# Patient Record
Sex: MALE | Race: WHITE | NOT HISPANIC OR LATINO
[De-identification: names, ages, dates, MRNs, and addresses within clinical notes are randomized per-mention and may not be internally consistent; named-entity substitution may affect disease eponyms.]

---

## 2017-11-17 ENCOUNTER — APPOINTMENT (OUTPATIENT)
Dept: DERMATOLOGY | Facility: CLINIC | Age: 57
End: 2017-11-17
Payer: COMMERCIAL

## 2017-11-17 PROCEDURE — 99213 OFFICE O/P EST LOW 20 MIN: CPT

## 2018-11-16 ENCOUNTER — APPOINTMENT (OUTPATIENT)
Dept: DERMATOLOGY | Facility: CLINIC | Age: 58
End: 2018-11-16
Payer: COMMERCIAL

## 2018-11-16 PROCEDURE — 99213 OFFICE O/P EST LOW 20 MIN: CPT

## 2019-11-15 ENCOUNTER — APPOINTMENT (OUTPATIENT)
Dept: DERMATOLOGY | Facility: CLINIC | Age: 59
End: 2019-11-15
Payer: COMMERCIAL

## 2019-11-15 PROCEDURE — 11102 TANGNTL BX SKIN SINGLE LES: CPT

## 2019-11-15 PROCEDURE — 99214 OFFICE O/P EST MOD 30 MIN: CPT | Mod: 25

## 2020-11-20 ENCOUNTER — APPOINTMENT (OUTPATIENT)
Dept: DERMATOLOGY | Facility: CLINIC | Age: 60
End: 2020-11-20
Payer: COMMERCIAL

## 2020-11-20 PROCEDURE — 99214 OFFICE O/P EST MOD 30 MIN: CPT

## 2021-11-22 ENCOUNTER — APPOINTMENT (OUTPATIENT)
Dept: DERMATOLOGY | Facility: CLINIC | Age: 61
End: 2021-11-22
Payer: COMMERCIAL

## 2021-11-22 PROCEDURE — 99213 OFFICE O/P EST LOW 20 MIN: CPT

## 2022-10-27 ENCOUNTER — APPOINTMENT (OUTPATIENT)
Dept: GASTROENTEROLOGY | Facility: CLINIC | Age: 62
End: 2022-10-27

## 2022-10-27 VITALS
DIASTOLIC BLOOD PRESSURE: 68 MMHG | HEART RATE: 59 BPM | RESPIRATION RATE: 16 BRPM | SYSTOLIC BLOOD PRESSURE: 118 MMHG | OXYGEN SATURATION: 98 % | BODY MASS INDEX: 24.83 KG/M2 | TEMPERATURE: 98.7 F | WEIGHT: 149 LBS | HEIGHT: 65 IN

## 2022-10-27 DIAGNOSIS — Z78.9 OTHER SPECIFIED HEALTH STATUS: ICD-10-CM

## 2022-10-27 DIAGNOSIS — Z80.0 FAMILY HISTORY OF MALIGNANT NEOPLASM OF DIGESTIVE ORGANS: ICD-10-CM

## 2022-10-27 PROCEDURE — 99205 OFFICE O/P NEW HI 60 MIN: CPT

## 2022-11-02 ENCOUNTER — LABORATORY RESULT (OUTPATIENT)
Age: 62
End: 2022-11-02

## 2022-11-02 LAB
INR PPP: 0.97 RATIO
PT BLD: 11.3 SEC

## 2022-11-21 ENCOUNTER — APPOINTMENT (OUTPATIENT)
Dept: GASTROENTEROLOGY | Facility: CLINIC | Age: 62
End: 2022-11-21

## 2022-11-21 VITALS
HEIGHT: 65 IN | RESPIRATION RATE: 16 BRPM | BODY MASS INDEX: 24.83 KG/M2 | SYSTOLIC BLOOD PRESSURE: 125 MMHG | OXYGEN SATURATION: 98 % | WEIGHT: 149 LBS | HEART RATE: 60 BPM | DIASTOLIC BLOOD PRESSURE: 80 MMHG | TEMPERATURE: 98 F

## 2022-11-21 LAB
A1AT PHENOTYP SERPL-IMP: NORMAL
A1AT SERPL-MCNC: 168 MG/DL
ALBUMIN SERPL ELPH-MCNC: 4.4 G/DL
ALP BLD-CCNC: 104 U/L
ALT SERPL-CCNC: 478 U/L
ANION GAP SERPL CALC-SCNC: 12 MMOL/L
AST SERPL-CCNC: 264 U/L
BILIRUB INDIRECT SERPL-MCNC: 0.2 MG/DL
BILIRUB SERPL-MCNC: 0.4 MG/DL
BUN SERPL-MCNC: 14 MG/DL
CALCIUM SERPL-MCNC: 9.4 MG/DL
CHLORIDE SERPL-SCNC: 104 MMOL/L
CMV DNA SPEC QL NAA+PROBE: NOT DETECTED IU/ML
CMVPCR LOG: NOT DETECTED LOG10IU/ML
CO2 SERPL-SCNC: 22 MMOL/L
CREAT SERPL-MCNC: 1.05 MG/DL
EBV DNA SERPL NAA+PROBE-ACNC: NOT DETECTED IU/ML
EBVPCR LOG: NOT DETECTED LOG10IU/ML
EGFR: 80 ML/MIN/1.73M2
FERRITIN SERPL-MCNC: 151 NG/ML
GLUCOSE SERPL-MCNC: 118 MG/DL
HEPATITIS E IGM ABY: NOT DETECTED
HETEROPH AB SER QL: NEGATIVE
HEV AB SER QL: NEGATIVE
HIV1+2 AB SPEC QL IA.RAPID: NONREACTIVE
HSV 1 IGG TYPE-SPECIFIC AB: 1.14 INDEX
HSV 2 IGG TYPE-SPECIFIC AB: <0.9 INDEX
IRON SATN MFR SERPL: 32 %
IRON SERPL-MCNC: 128 UG/DL
NT-PROBNP SERPL-MCNC: 43 PG/ML
POTASSIUM SERPL-SCNC: 4.7 MMOL/L
PROT SERPL-MCNC: 6.6 G/DL
SODIUM SERPL-SCNC: 137 MMOL/L
SOLUBLE LIVER IGG SER IA-ACNC: 1.1
T PALLIDUM AB SER QL IA: NEGATIVE
TIBC SERPL-MCNC: 406 UG/DL
TM INTERPRETATION: NORMAL
TSH SERPL-ACNC: 1.16 UIU/ML
UIBC SERPL-MCNC: 277 UG/DL

## 2022-11-21 PROCEDURE — 99215 OFFICE O/P EST HI 40 MIN: CPT

## 2022-11-21 NOTE — ADDENDUM
[FreeTextEntry1] : I, Salome Welch NP, acted as scribe for SUSAN Henderson for this patient encounter.

## 2022-11-21 NOTE — PHYSICAL EXAM
[Non-Tender] : non-tender [Smooth] : smooth [General Appearance - Alert] : alert [General Appearance - In No Acute Distress] : in no acute distress [Sclera] : the sclera and conjunctiva were normal [Outer Ear] : the ears and nose were normal in appearance [Oropharynx] : the oropharynx was normal [Neck Appearance] : the appearance of the neck was normal [Auscultation Breath Sounds / Voice Sounds] : lungs were clear to auscultation bilaterally [Heart Rate And Rhythm] : heart rate was normal and rhythm regular [Edema] : there was no peripheral edema [Bowel Sounds] : normal bowel sounds [Abdomen Soft] : soft [Abdomen Tenderness] : non-tender [Abdomen Mass (___ Cm)] : no abdominal mass palpated [Abnormal Walk] : normal gait [Nail Clubbing] : no clubbing  or cyanosis of the fingernails [Musculoskeletal - Swelling] : no joint swelling seen [Motor Tone] : muscle strength and tone were normal [Skin Color & Pigmentation] : normal skin color and pigmentation [Skin Turgor] : normal skin turgor [] : no rash [No Focal Deficits] : no focal deficits [Oriented To Time, Place, And Person] : oriented to person, place, and time [Impaired Insight] : insight and judgment were intact [Affect] : the affect was normal [Scleral Icterus] : No Scleral Icterus [Spider Angioma] : No spider angioma(s) were observed [Abdominal  Ascites] : no ascites [Splenomegaly] : no splenomegaly [Scrotal Edema] : Scrotum is not edematous [Asterixis] : no asterixis observed [Jaundice] : No jaundice [Palmar Erythema] : no Palmar Erythema [Depression] : no depression [Hallucinations] : ~T no ~M hallucinations

## 2022-11-21 NOTE — ASSESSMENT
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 LFTs can remain elevated for up to a year after discontinuation.\par LFTs remain elevated and actually higher than previously\par will recheck LFTs today\par if LFTs are not downtrending, will consider Liver Biopsy\par CT abdomen pending\par \par Follow up after labs

## 2022-11-21 NOTE — HISTORY OF PRESENT ILLNESS
[de-identified] : JAMES CARTER is a 62 year old male with no significant PMH presents today for evaluation of elevated LFTs.\par \par 11/21/22: Labs done at last visit reviewed with pt. LFTs now Bilirubin 0.4, , , . Previous LFTs in August - Bilirubin 0.5, , , AP 94. Iron studies normal, A1A normal, TFTs normal. Hemochromatosis + H63D heterozygous mutation. [place] [facility] [place] and autoimmune hepatitis and normal ceruloplasmin. US abdomen done in August was normal except for 3 mm gallbladder polyp.\par \par Denies pruritus, recent infection, abdominal pain or distension, jaundice, hematemesis, hematochezia, dark urine, confusion, unintentional weight loss or gain. Drinks alcohol socially. Pt does take multiple herbal supplements mainly for exercise and weight management. Pt has stopped all supplements since elevation in LFTs found. Pt is pending CT scan to be done. [FreeTextEntry1] : 62-year-old male patient with no significant past medical history presents for evaluation of elevated LFTs.\par \par \par No risk factors of viral hepatitis.\par No significant alcohol intake.\par No family history of liver disease.\par No history of sudden cardiac deaths in the family.\par  \par \par Denies Abdominal pain, Abdominal distension, Jaundice,Pedal Edema,Hematemesis,Melena,Confusion.\par Physical exam:\par Gen: A&Ox3, NAD\par HEENT: normal outer ears & nose, PERRL, mucus membranes moist, anicteric, no lymphadenopathy\par CVS: regular rate and rhythm, no murmur\par Pulm: vesicular breath sounds\par Abdomen: normal bowel sounds, soft, nontender, no hepatosplenomegaly\par Legs: no edema, no clubbing\par Musculoskeletal: normal gait\par Skin: no rash\par Neuro: no asterixis, EOMI\par Psych: normal affect\par \par

## 2022-11-22 ENCOUNTER — RESULT REVIEW (OUTPATIENT)
Age: 62
End: 2022-11-22

## 2022-11-22 LAB
ALBUMIN SERPL ELPH-MCNC: 4.3 G/DL
ALP BLD-CCNC: 95 U/L
ALT SERPL-CCNC: 322 U/L
AST SERPL-CCNC: 188 U/L
BILIRUB DIRECT SERPL-MCNC: 0.1 MG/DL
BILIRUB INDIRECT SERPL-MCNC: 0.2 MG/DL
BILIRUB SERPL-MCNC: 0.3 MG/DL
INR PPP: 0.98 RATIO
PROT SERPL-MCNC: 6.7 G/DL
PT BLD: 11.4 SEC

## 2022-11-22 NOTE — ASSESSMENT
[FreeTextEntry1] : Labs ordered to rule out various etiologies of liver disease that would not done at the primary GI clinic.\par Ordered HSV CMV EBV RPR for syphilis, HEV Hiv etc\par Recommendation based on lab values.  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

## 2022-11-22 NOTE — HISTORY OF PRESENT ILLNESS
[FreeTextEntry1] : A very fit and young looking 62-year-old male patient with no significant past medical history presents for evaluation of elevated LFTs when he saw his primary care physician for some migratory joint pains.  No such prior history.\par Labs on 8/30/2022 showed AST of 119  normal alkaline phosphatase, normal bilirubin.  At that time hepatitis A, B, and C serologies were sent out which were negative and as per the scanned chart report from [facility] [place] the patient had normal ceruloplasmin normal ASMA AMA, immunoglobulin G.  He has H63D hemochromatosis gene mutatio, alpha-1 antitrypsin level normal, AFP normal\par Ultrasound done on 8/30/2022 showed a normal liver and spleen with gallbladder polyps up to 3 mm in diameter.\par Repeat labs on 9/30/2022 showed ALT of 307, , alkaline phosphatase 94, normal INR\par Labs on\par \par He has no past medical history of any disease and is not anything taking any therapeutic medications.\par Patient does have a significant history of taking supplements\par \par \par No risk factors of viral hepatitis.\par No significant alcohol intake.\par No family history of liver disease.  Mother had pancreatic cancer.\par No history of sudden cardiac deaths in the family.\par  \par \par Denies Abdominal pain, Abdominal distension, Jaundice,Pedal Edema,Hematemesis,Melena,Confusion.\par Physical exam:\par Gen: A&Ox3, NAD\par HEENT: normal outer ears & nose, PERRL, mucus membranes moist, anicteric, no lymphadenopathy\par CVS: regular rate and rhythm, no murmur\par Pulm: vesicular breath sounds\par Abdomen: normal bowel sounds, soft, nontender, no hepatosplenomegaly\par Legs: no edema, no clubbing\par Musculoskeletal: normal gait\par Skin: no rash\par Neuro: no asterixis, EOMI\par Psych: normal affect\par \par

## 2022-11-23 ENCOUNTER — APPOINTMENT (OUTPATIENT)
Dept: DERMATOLOGY | Facility: CLINIC | Age: 62
End: 2022-11-23

## 2022-11-23 PROCEDURE — 11102 TANGNTL BX SKIN SINGLE LES: CPT

## 2022-11-23 PROCEDURE — 99213 OFFICE O/P EST LOW 20 MIN: CPT | Mod: 25

## 2022-11-23 PROCEDURE — 11103 TANGNTL BX SKIN EA SEP/ADDL: CPT

## 2022-11-30 ENCOUNTER — APPOINTMENT (OUTPATIENT)
Dept: GASTROENTEROLOGY | Facility: CLINIC | Age: 62
End: 2022-11-30

## 2022-11-30 VITALS
OXYGEN SATURATION: 98 % | DIASTOLIC BLOOD PRESSURE: 70 MMHG | WEIGHT: 149 LBS | HEIGHT: 65 IN | RESPIRATION RATE: 16 BRPM | SYSTOLIC BLOOD PRESSURE: 120 MMHG | HEART RATE: 70 BPM | BODY MASS INDEX: 24.83 KG/M2

## 2022-11-30 PROCEDURE — 99215 OFFICE O/P EST HI 40 MIN: CPT

## 2022-11-30 NOTE — ASSESSMENT
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 LFTs can remain elevated for up to a year after discontinuation.\par LFTs are downtrending, pt has discontinued all supplements for the last 2 months\par CT Abdomen w/wo IVC 11/22/22 was unremarkable, liver was normal without evidence of cirrhosis or focal lesion, no biliary duct abnormalities, spleen unremarkable.\par Discussed liver biopsy with pt, pt would like to recheck LFTs in a month and then make decision on whether to proceed with liver biopsy\par Labs ordered\par \par Follow up in 1 month

## 2022-11-30 NOTE — HISTORY OF PRESENT ILLNESS
[de-identified] : JAMES CARTER is a 62 year old male with no significant PMH presents today for evaluation of elevated LFTs.\par \par 10/27/22: A very fit and young looking 62-year-old male patient with no significant past medical history presents for evaluation of elevated LFTs when he saw his primary care physician for some migratory joint pains. No such prior history.\par Labs on 8/30/2022 showed AST of 119  normal alkaline phosphatase, normal bilirubin. At that time hepatitis A, B, and C serologies were sent out which were negative and as per the scanned chart report from [facility] the patient had normal ceruloplasmin normal ASMA AMA, immunoglobulin G. He has H63D hemochromatosis gene mutatio, alpha-1 antitrypsin level normal, AFP normal\par Ultrasound done on 8/30/2022 showed a normal liver and spleen with gallbladder polyps up to 3 mm in diameter.\par Repeat labs on 9/30/2022 showed ALT of 307, , alkaline phosphatase 94, normal INR\par Labs on\par \par He has no past medical history of any disease and is not anything taking any therapeutic medications.\par Patient does have a significant history of taking supplements\par \par \par No risk factors of viral hepatitis.\par No significant alcohol intake.\par No family history of liver disease. Mother had pancreatic cancer.\par No history of sudden cardiac deaths in the family.\par \par 11/21/22: Labs done at last visit reviewed with pt. LFTs now Bilirubin 0.4, , , . Previous LFTs in August - Bilirubin 0.5, , , AP 94. Iron studies normal, A1A normal, TFTs normal. Hemochromatosis + H63D heterozygous mutation. Previous work up done at [facility] [place] was negative for viral hepatitis and autoimmune hepatitis and normal ceruloplasmin. US abdomen done in August was normal except for 3 mm gallbladder polyp.\par \par Denies pruritus, recent infection, abdominal pain or distension, jaundice, hematemesis, hematochezia, dark urine, confusion, unintentional weight loss or gain. Drinks alcohol socially. Pt does take multiple herbal supplements mainly for exercise and weight management. Pt has stopped all supplements since elevation in LFTs found. Pt is pending CT scan to be done.\par \par 11/30/22: CT Abdomen w/wo IVC 11/22/22 was unremarkable, liver was normal without evidence of cirrhosis or focal lesion, no biliary duct abnormalities, spleen unremarkable. Labs done 11/21/22 - bilirubin 0.3, , , AP 95.

## 2023-01-07 ENCOUNTER — LABORATORY RESULT (OUTPATIENT)
Age: 63
End: 2023-01-07

## 2023-01-10 ENCOUNTER — APPOINTMENT (OUTPATIENT)
Dept: GASTROENTEROLOGY | Facility: CLINIC | Age: 63
End: 2023-01-10
Payer: COMMERCIAL

## 2023-01-10 VITALS
RESPIRATION RATE: 16 BRPM | SYSTOLIC BLOOD PRESSURE: 138 MMHG | DIASTOLIC BLOOD PRESSURE: 74 MMHG | WEIGHT: 151 LBS | HEART RATE: 80 BPM | BODY MASS INDEX: 25.16 KG/M2 | TEMPERATURE: 98.3 F | HEIGHT: 65 IN | OXYGEN SATURATION: 98 %

## 2023-01-10 PROCEDURE — 99214 OFFICE O/P EST MOD 30 MIN: CPT

## 2023-01-10 NOTE — HISTORY OF PRESENT ILLNESS
[de-identified] : JAMES CARTER is a 62 year old male with no significant PMH presents today for evaluation of elevated LFTs.\par \par 10/27/22: A very fit and young looking 62-year-old male patient with no significant past medical history presents for evaluation of elevated LFTs when he saw his primary care physician for some migratory joint pains. No such prior history.\par Labs on 8/30/2022 showed AST of 119  normal alkaline phosphatase, normal bilirubin. At that time hepatitis A, B, and C serologies were sent out which were negative and as per the scanned chart report from [facility] the patient had normal ceruloplasmin normal ASMA AMA, immunoglobulin G. He has H63D hemochromatosis gene mutatio, alpha-1 antitrypsin level normal, AFP normal\par Ultrasound done on 8/30/2022 showed a normal liver and spleen with gallbladder polyps up to 3 mm in diameter.\par Repeat labs on 9/30/2022 showed ALT of 307, , alkaline phosphatase 94, normal INR\par Labs on\par \par He has no past medical history of any disease and is not anything taking any therapeutic medications.\par Patient does have a significant history of taking supplements\par \par \par No risk factors of viral hepatitis.\par No significant alcohol intake.\par No family history of liver disease. Mother had pancreatic cancer.\par No history of sudden cardiac deaths in the family.\par \par 11/21/22: Labs done at last visit reviewed with pt. LFTs now Bilirubin 0.4, , , . Previous LFTs in August - Bilirubin 0.5, , , AP 94. Iron studies normal, A1A normal, TFTs normal. Hemochromatosis + H63D heterozygous mutation. [place] [facility] [place] for viral [place] and autoimmune hepatitis and normal ceruloplasmin. US abdomen done in August was normal except for 3 mm gallbladder polyp.\par \par Denies pruritus, recent infection, abdominal pain or distension, jaundice, hematemesis, hematochezia, dark urine, confusion, unintentional weight loss or gain. Drinks alcohol socially. Pt does take multiple herbal supplements mainly for exercise and weight management. Pt has stopped all supplements since elevation in LFTs found. Pt is pending CT scan to be done.\par \par 11/30/22: CT Abdomen w/wo IVC 11/22/22 was unremarkable, liver was normal without evidence of cirrhosis or focal lesion, no biliary duct abnormalities, spleen unremarkable. Labs done 11/21/22 - bilirubin 0.3, , , AP 95.\par \par 1/10/23: Labs reviewed with pt. Bilirubin 0.7, , , AP 75. Pt continues to abstain from taking any supplements. Reports he is feeling well.

## 2023-01-10 NOTE — ASSESSMENT
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 LFTs can remain elevated for up to a year after discontinuation.\par LFTs are downtrending, pt has discontinued all supplements.\par CT Abdomen w/wo IVC 11/22/22 was unremarkable, liver was normal without evidence of cirrhosis or focal lesion, no biliary duct abnormalities, spleen unremarkable.\par Discussed liver biopsy with pt, however pt would like to defer this for now\par Pt will have LFTs checked with his pcp in 3 months\par \par Follow up in 6 months

## 2023-01-13 LAB
ALBUMIN SERPL ELPH-MCNC: 4.2 G/DL
ALP BLD-CCNC: 75 U/L
ALT SERPL-CCNC: 223 U/L
ANION GAP SERPL CALC-SCNC: 9 MMOL/L
AST SERPL-CCNC: 138 U/L
BASOPHILS # BLD AUTO: 0.04 K/UL
BASOPHILS NFR BLD AUTO: 0.9 %
BILIRUB INDIRECT SERPL-MCNC: 0.5 MG/DL
BILIRUB SERPL-MCNC: 0.7 MG/DL
BUN SERPL-MCNC: 13 MG/DL
CALCIUM SERPL-MCNC: 9.9 MG/DL
CHLORIDE SERPL-SCNC: 101 MMOL/L
CO2 SERPL-SCNC: 25 MMOL/L
CREAT SERPL-MCNC: 1.04 MG/DL
EGFR: 81 ML/MIN/1.73M2
EOSINOPHIL # BLD AUTO: 0.13 K/UL
EOSINOPHIL NFR BLD AUTO: 3 %
GLUCOSE SERPL-MCNC: 93 MG/DL
HCT VFR BLD CALC: 41.1 %
HGB BLD-MCNC: 14.8 G/DL
IMM GRANULOCYTES NFR BLD AUTO: 0.2 %
INR PPP: 1.02 RATIO
LYMPHOCYTES # BLD AUTO: 1.85 K/UL
LYMPHOCYTES NFR BLD AUTO: 42.2 %
MAN DIFF?: NORMAL
MCHC RBC-ENTMCNC: 33 PG
MCHC RBC-ENTMCNC: 36 GM/DL
MCV RBC AUTO: 91.5 FL
MONOCYTES # BLD AUTO: 0.45 K/UL
MONOCYTES NFR BLD AUTO: 10.3 %
NEUTROPHILS # BLD AUTO: 1.9 K/UL
NEUTROPHILS NFR BLD AUTO: 43.4 %
PLATELET # BLD AUTO: 283 K/UL
POTASSIUM SERPL-SCNC: 5.1 MMOL/L
PROT SERPL-MCNC: 6.8 G/DL
PT BLD: 11.9 SEC
RBC # BLD: 4.49 M/UL
RBC # FLD: 15.6 %
SODIUM SERPL-SCNC: 135 MMOL/L
WBC # FLD AUTO: 4.38 K/UL

## 2023-07-17 LAB
ALBUMIN SERPL ELPH-MCNC: 4.5 G/DL
ALP BLD-CCNC: 58 U/L
ALT SERPL-CCNC: 48 U/L
AST SERPL-CCNC: 50 U/L
BILIRUB DIRECT SERPL-MCNC: 0.2 MG/DL
BILIRUB INDIRECT SERPL-MCNC: 0.4 MG/DL
BILIRUB SERPL-MCNC: 0.5 MG/DL
INR PPP: 1 RATIO
PROT SERPL-MCNC: 6.8 G/DL
PT BLD: 11.6 SEC

## 2023-07-27 ENCOUNTER — APPOINTMENT (OUTPATIENT)
Dept: GASTROENTEROLOGY | Facility: CLINIC | Age: 63
End: 2023-07-27
Payer: COMMERCIAL

## 2023-07-27 VITALS
BODY MASS INDEX: 25.33 KG/M2 | HEART RATE: 66 BPM | TEMPERATURE: 98.3 F | HEIGHT: 65 IN | DIASTOLIC BLOOD PRESSURE: 89 MMHG | SYSTOLIC BLOOD PRESSURE: 123 MMHG | RESPIRATION RATE: 16 BRPM | WEIGHT: 152 LBS | OXYGEN SATURATION: 98 %

## 2023-07-27 DIAGNOSIS — R74.8 ABNORMAL LEVELS OF OTHER SERUM ENZYMES: ICD-10-CM

## 2023-07-27 PROCEDURE — 99215 OFFICE O/P EST HI 40 MIN: CPT

## 2023-07-27 NOTE — ASSESSMENT
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 LFTs can remain elevated for up to a year after discontinuation.\par CT Abdomen w/wo IVC 11/22/22 was unremarkable, liver was normal without evidence of cirrhosis or focal lesion, no biliary duct abnormalities, spleen unremarkable.\par \par Follow up in 1 year w/labs

## 2023-07-27 NOTE — HISTORY OF PRESENT ILLNESS
[de-identified] : JAMES CARTER is a 62 year old male with no significant PMH presents today for evaluation of elevated LFTs.\par \par 7/27/23: Pt presents for f/u visit. Labs 7/14/23 - tbili 0.5, AST 50, ALT 48, AP 58, INR 1.00. Since last visit pt has restarted Balance of [employer] supplement but has not taken any of his other supplements since November 2022. No acute events since last visit.\par \par 1/10/23: Labs reviewed with pt. Bilirubin 0.7, , , AP 75. Pt continues to abstain from taking any supplements. Reports he is feeling well. \par \par 11/30/22: CT Abdomen w/wo IVC 11/22/22 was unremarkable, liver was normal without evidence of cirrhosis or focal lesion, no biliary duct abnormalities, spleen unremarkable. Labs done 11/21/22 - bilirubin 0.3, , , AP 95.\par \par 11/21/22: Labs done at last visit reviewed with pt. LFTs now Bilirubin 0.4, , , . Previous LFTs in August - Bilirubin 0.5, , , AP 94. Iron studies normal, A1A normal, TFTs normal. Hemochromatosis + H63D heterozygous mutation. Previous work up done at [facility] [place] was negative for viral hepatitis and autoimmune hepatitis and normal ceruloplasmin. US abdomen done in August was normal except for 3 mm gallbladder polyp.\par \par Denies pruritus, recent infection, abdominal pain or distension, jaundice, hematemesis, hematochezia, dark urine, confusion, unintentional weight loss or gain. Drinks alcohol socially. Pt does take multiple herbal supplements mainly for exercise and weight management. Pt has stopped all supplements since elevation in LFTs found. Pt is pending CT scan to be done.\par \par 10/27/22: A very fit and young looking 62-year-old male patient with no significant past medical history presents for evaluation of elevated LFTs when he saw his primary care physician for some migratory joint pains. No such prior history.\par Labs on 8/30/2022 showed AST of 119  normal alkaline phosphatase, normal bilirubin. At that time hepatitis A, B, and C serologies were sent out which were negative and as per the scanned chart report from [facility] [place] the patient had normal ceruloplasmin normal ASMA AMA, immunoglobulin G. He has H63D hemochromatosis gene mutatio, alpha-1 antitrypsin level normal, AFP normal\par Ultrasound done on 8/30/2022 showed a normal liver and spleen with gallbladder polyps up to 3 mm in diameter.\par Repeat labs on 9/30/2022 showed ALT of 307, , alkaline phosphatase 94, normal INR\par Labs on\par \par He has no past medical history of any disease and is not anything taking any therapeutic medications.\par Patient does have a significant history of taking supplements\par \par \par No risk factors of viral hepatitis.\par No significant alcohol intake.\par No family history of liver disease. Mother had pancreatic cancer.\par No history of sudden cardiac deaths in the family.

## 2023-11-22 ENCOUNTER — APPOINTMENT (OUTPATIENT)
Dept: DERMATOLOGY | Facility: CLINIC | Age: 63
End: 2023-11-22
Payer: COMMERCIAL

## 2023-11-22 PROCEDURE — 17261 DSTRJ MAL LES T/A/L .6-1.0CM: CPT

## 2023-11-22 PROCEDURE — 17000 DESTRUCT PREMALG LESION: CPT | Mod: 59

## 2023-11-22 PROCEDURE — D0125: CPT

## 2023-11-22 PROCEDURE — 99213 OFFICE O/P EST LOW 20 MIN: CPT | Mod: 25

## 2024-04-16 ENCOUNTER — APPOINTMENT (OUTPATIENT)
Dept: DERMATOLOGY | Facility: CLINIC | Age: 64
End: 2024-04-16

## 2024-08-01 ENCOUNTER — APPOINTMENT (OUTPATIENT)
Dept: GASTROENTEROLOGY | Facility: CLINIC | Age: 64
End: 2024-08-01
Payer: MEDICARE

## 2024-08-01 VITALS
WEIGHT: 151 LBS | SYSTOLIC BLOOD PRESSURE: 129 MMHG | HEIGHT: 65 IN | HEART RATE: 69 BPM | BODY MASS INDEX: 25.16 KG/M2 | OXYGEN SATURATION: 98 % | DIASTOLIC BLOOD PRESSURE: 80 MMHG | TEMPERATURE: 97.6 F | RESPIRATION RATE: 14 BRPM

## 2024-08-01 DIAGNOSIS — R74.8 ABNORMAL LEVELS OF OTHER SERUM ENZYMES: ICD-10-CM

## 2024-08-01 PROCEDURE — 99203 OFFICE O/P NEW LOW 30 MIN: CPT

## 2024-08-02 NOTE — HISTORY OF PRESENT ILLNESS
[de-identified] : JAMES CARTER is a 62 year old male with no significant PMH presents today for evaluation of elevated LFTs.  8/1/24: f/u visit. Pt reports he has cut out all of his previous supplements. LFTs have improved. Labs 7/24/24 - tbili 0.3, AST 33, ALT 22, AP 49, sCrt 1.33. Pt reports he is taking creatine. Denies abdominal pain or distension, jaundice, hematemesis, hematochezia, dark urine, confusion, unintentional weight loss or gain.   7/27/23: Pt presents for f/u visit. Labs 7/14/23 - tbili 0.5, AST 50, ALT 48, AP 58, INR 1.00. Since last visit pt has restarted Balance of Nature supplement but has not taken any of his other supplements since November 2022. No acute events since last visit.  1/10/23: Labs reviewed with pt. Bilirubin 0.7, , , AP 75. Pt continues to abstain from taking any supplements. Reports he is feeling well.   11/30/22: CT Abdomen w/wo IVC 11/22/22 was unremarkable, liver was normal without evidence of cirrhosis or focal lesion, no biliary duct abnormalities, spleen unremarkable. Labs done 11/21/22 - bilirubin 0.3, , , AP 95.  11/21/22: Labs done at last visit reviewed with pt. LFTs now Bilirubin 0.4, , , . Previous LFTs in August - Bilirubin 0.5, , , AP 94. Iron studies normal, A1A normal, TFTs normal. Hemochromatosis + H63D heterozygous mutation. [place] [facility] [place] [facility] [place] and autoimmune hepatitis and normal ceruloplasmin. US abdomen done in August was normal except for 3 mm gallbladder polyp.  Denies pruritus, recent infection, abdominal pain or distension, jaundice, hematemesis, hematochezia, dark urine, confusion, unintentional weight loss or gain. Drinks alcohol socially. Pt does take multiple herbal supplements mainly for exercise and weight management. Pt has stopped all supplements since elevation in LFTs found. Pt is pending CT scan to be done.  10/27/22: A very fit and young looking 62-year-old male patient with no significant past medical history presents for evaluation of elevated LFTs when he saw his primary care physician for some migratory joint pains. No such prior history. Labs on 8/30/2022 showed AST of 119  normal alkaline phosphatase, normal bilirubin. At that time hepatitis A, B, and C serologies were sent out which were negative and as per the scanned chart report from [facility] the patient had normal ceruloplasmin normal ASMA AMA, immunoglobulin G. He has H63D hemochromatosis gene mutatio, alpha-1 antitrypsin level normal, AFP normal Ultrasound done on 8/30/2022 showed a normal liver and spleen with gallbladder polyps up to 3 mm in diameter. Repeat labs on 9/30/2022 showed ALT of 307, , alkaline phosphatase 94, normal INR Labs on  He has no past medical history of any disease and is not anything taking any therapeutic medications. Patient does have a significant history of taking supplements   No risk factors of viral hepatitis. No significant alcohol intake. No family history of liver disease. Mother had pancreatic cancer. No history of sudden cardiac deaths in the family.

## 2024-08-02 NOTE — ASSESSMENT
[FreeTextEntry1] : JAMES CARTER is a 64 year old male with no significant PMH presents today for evaluation of elevated LFTs.  Elevated LFTs likely DILI w/interim normalization of LFTs w/discontinuation of supplements advised continue avoiding supplements he is currently taking creatine, sCrt increased, advised pt to discuss w/pcp and d/c creatine Follow up PRN

## 2024-08-02 NOTE — HISTORY OF PRESENT ILLNESS
[de-identified] : JAMES CARTER is a 62 year old male with no significant PMH presents today for evaluation of elevated LFTs.  8/1/24: f/u visit. Pt reports he has cut out all of his previous supplements. LFTs have improved. Labs 7/24/24 - tbili 0.3, AST 33, ALT 22, AP 49, sCrt 1.33. Pt reports he is taking creatine. Denies abdominal pain or distension, jaundice, hematemesis, hematochezia, dark urine, confusion, unintentional weight loss or gain.   7/27/23: Pt presents for f/u visit. Labs 7/14/23 - tbili 0.5, AST 50, ALT 48, AP 58, INR 1.00. Since last visit pt has restarted Balance of Nature supplement but has not taken any of his other supplements since November 2022. No acute events since last visit.  1/10/23: Labs reviewed with pt. Bilirubin 0.7, , , AP 75. Pt continues to abstain from taking any supplements. Reports he is feeling well.   11/30/22: CT Abdomen w/wo IVC 11/22/22 was unremarkable, liver was normal without evidence of cirrhosis or focal lesion, no biliary duct abnormalities, spleen unremarkable. Labs done 11/21/22 - bilirubin 0.3, , , AP 95.  11/21/22: Labs done at last visit reviewed with pt. LFTs now Bilirubin 0.4, , , . Previous LFTs in August - Bilirubin 0.5, , , AP 94. Iron studies normal, A1A normal, TFTs normal. Hemochromatosis + H63D heterozygous mutation. Previous work up done at [facility] [place] was negative for viral hepatitis and autoimmune hepatitis and normal ceruloplasmin. US abdomen done in August was normal except for 3 mm gallbladder polyp.  Denies pruritus, recent infection, abdominal pain or distension, jaundice, hematemesis, hematochezia, dark urine, confusion, unintentional weight loss or gain. Drinks alcohol socially. Pt does take multiple herbal supplements mainly for exercise and weight management. Pt has stopped all supplements since elevation in LFTs found. Pt is pending CT scan to be done.  10/27/22: A very fit and young looking 62-year-old male patient with no significant past medical history presents for evaluation of elevated LFTs when he saw his primary care physician for some migratory joint pains. No such prior history. Labs on 8/30/2022 showed AST of 119  normal alkaline phosphatase, normal bilirubin. At that time hepatitis A, B, and C serologies were sent out which were negative and as per the scanned chart report from [facility] [place] the patient had normal ceruloplasmin normal ASMA AMA, immunoglobulin G. He has H63D hemochromatosis gene mutatio, alpha-1 antitrypsin level normal, AFP normal Ultrasound done on 8/30/2022 showed a normal liver and spleen with gallbladder polyps up to 3 mm in diameter. Repeat labs on 9/30/2022 showed ALT of 307, , alkaline phosphatase 94, normal INR Labs on  He has no past medical history of any disease and is not anything taking any therapeutic medications. Patient does have a significant history of taking supplements   No risk factors of viral hepatitis. No significant alcohol intake. No family history of liver disease. Mother had pancreatic cancer. No history of sudden cardiac deaths in the family.

## 2024-11-25 ENCOUNTER — APPOINTMENT (OUTPATIENT)
Dept: DERMATOLOGY | Facility: CLINIC | Age: 64
End: 2024-11-25
Payer: MEDICARE

## 2024-11-25 PROCEDURE — 99203 OFFICE O/P NEW LOW 30 MIN: CPT

## 2025-04-24 ENCOUNTER — APPOINTMENT (OUTPATIENT)
Dept: DERMATOLOGY | Facility: CLINIC | Age: 65
End: 2025-04-24
Payer: MEDICARE

## 2025-04-24 PROCEDURE — 99213 OFFICE O/P EST LOW 20 MIN: CPT
